# Patient Record
Sex: MALE | Race: ASIAN | NOT HISPANIC OR LATINO | Employment: FULL TIME | URBAN - METROPOLITAN AREA
[De-identification: names, ages, dates, MRNs, and addresses within clinical notes are randomized per-mention and may not be internally consistent; named-entity substitution may affect disease eponyms.]

---

## 2017-02-25 ENCOUNTER — HOSPITAL ENCOUNTER (OUTPATIENT)
Dept: LAB | Facility: MEDICAL CENTER | Age: 62
End: 2017-02-25
Attending: INTERNAL MEDICINE
Payer: COMMERCIAL

## 2017-02-25 DIAGNOSIS — E83.52 HYPERCALCEMIA: ICD-10-CM

## 2017-02-25 DIAGNOSIS — E21.3 HYPERPARATHYROIDISM (HCC): ICD-10-CM

## 2017-02-25 LAB
ALBUMIN SERPL BCP-MCNC: 4.6 G/DL (ref 3.2–4.9)
ALBUMIN/GLOB SERPL: 1.4 G/DL
ALP SERPL-CCNC: 91 U/L (ref 30–99)
ALT SERPL-CCNC: 27 U/L (ref 2–50)
ANION GAP SERPL CALC-SCNC: 5 MMOL/L (ref 0–11.9)
AST SERPL-CCNC: 16 U/L (ref 12–45)
BILIRUB SERPL-MCNC: 0.6 MG/DL (ref 0.1–1.5)
BUN SERPL-MCNC: 15 MG/DL (ref 8–22)
CALCIUM SERPL-MCNC: 11.6 MG/DL (ref 8.5–10.5)
CHLORIDE SERPL-SCNC: 102 MMOL/L (ref 96–112)
CO2 SERPL-SCNC: 31 MMOL/L (ref 20–33)
CREAT SERPL-MCNC: 1.06 MG/DL (ref 0.5–1.4)
CREAT UR-MCNC: 88.4 MG/DL
GLOBULIN SER CALC-MCNC: 3.2 G/DL (ref 1.9–3.5)
GLUCOSE SERPL-MCNC: 93 MG/DL (ref 65–99)
PHOSPHATE SERPL-MCNC: 2.9 MG/DL (ref 2.5–4.5)
POTASSIUM SERPL-SCNC: 3.9 MMOL/L (ref 3.6–5.5)
PROT SERPL-MCNC: 7.8 G/DL (ref 6–8.2)
PTH-INTACT SERPL-MCNC: 85.4 PG/ML (ref 14–72)
SODIUM SERPL-SCNC: 138 MMOL/L (ref 135–145)
UR CREATININE 24 HR 1034: 1856 MG/24 HR (ref 1000–2000)
URINE VOLUMNE 8233: 2100 ML

## 2017-02-25 PROCEDURE — 82570 ASSAY OF URINE CREATININE: CPT

## 2017-02-25 PROCEDURE — 83970 ASSAY OF PARATHORMONE: CPT

## 2017-02-25 PROCEDURE — 36415 COLL VENOUS BLD VENIPUNCTURE: CPT

## 2017-02-25 PROCEDURE — 84100 ASSAY OF PHOSPHORUS: CPT

## 2017-02-25 PROCEDURE — 82340 ASSAY OF CALCIUM IN URINE: CPT

## 2017-02-25 PROCEDURE — 81050 URINALYSIS VOLUME MEASURE: CPT

## 2017-02-25 PROCEDURE — 80053 COMPREHEN METABOLIC PANEL: CPT

## 2017-02-27 ENCOUNTER — HOSPITAL ENCOUNTER (OUTPATIENT)
Dept: LAB | Facility: MEDICAL CENTER | Age: 62
End: 2017-02-27
Attending: INTERNAL MEDICINE
Payer: COMMERCIAL

## 2017-02-27 LAB — CA-I SERPL-SCNC: 1.5 MMOL/L (ref 1.1–1.3)

## 2017-02-27 PROCEDURE — 82330 ASSAY OF CALCIUM: CPT

## 2017-02-28 LAB
CALCIUM 24H UR-MCNC: 14.1 MG/DL
CALCIUM 24H UR-MRATE: 296 MG/D
CALCIUM/CREAT 24H UR: 196 MG/G (ref 20–240)
CREAT 24H UR-MCNC: 72 MG/DL
CREAT 24H UR-MRATE: 1512 MG/D (ref 800–2100)
SPECIMEN VOL ?TM UR: 2100 ML

## 2017-03-10 ENCOUNTER — OFFICE VISIT (OUTPATIENT)
Dept: ENDOCRINOLOGY | Facility: MEDICAL CENTER | Age: 62
End: 2017-03-10
Payer: COMMERCIAL

## 2017-03-10 VITALS
WEIGHT: 174.2 LBS | SYSTOLIC BLOOD PRESSURE: 128 MMHG | OXYGEN SATURATION: 96 % | HEART RATE: 80 BPM | BODY MASS INDEX: 25.8 KG/M2 | DIASTOLIC BLOOD PRESSURE: 82 MMHG | HEIGHT: 69 IN

## 2017-03-10 DIAGNOSIS — E21.0 PRIMARY HYPERPARATHYROIDISM (HCC): ICD-10-CM

## 2017-03-10 DIAGNOSIS — E83.52 HYPERCALCEMIA: ICD-10-CM

## 2017-03-10 PROCEDURE — 99213 OFFICE O/P EST LOW 20 MIN: CPT | Performed by: INTERNAL MEDICINE

## 2017-03-10 NOTE — MR AVS SNAPSHOT
"        Michael Mixon   3/10/2017 8:20 AM   Office Visit   MRN: [id number]    Department:  Endocrinology [facility]   Dept Phone:  [phone]    Description:  Male : 1955   Provider:  Carlos Slater M.D.           Reason for Visit     Follow-Up Hypercalcemia      Allergies as of 3/10/2017     Allergen Noted Reactions    Sulfa Drugs 2010   Anaphylaxis      You were diagnosed with     Primary hyperparathyroidism (CMS-HCC)   [252.01.ICD-9-CM]       Hypercalcemia   [275.42.ICD-9-CM]         Vital Signs     Blood Pressure Pulse Height Weight Body Mass Index Oxygen Saturation    128/82 mmHg 80 1.746 m (5' 8.75\") 79.017 kg (174 lb 3.2 oz) 25.92 kg/m2 96%    Smoking Status                   Never Smoker            Basic Information     Date Of Birth Sex Race Ethnicity Preferred Language    1955 Male  Non- English      Your appointments     Sep 08, 2017 12:00 PM   Established Patient with Carlos Slater M.D.   [facility]    [address], [address]  [place] [address] [phone]           You will be receiving a confirmation call a few days before your appointment from our automated call confirmation system.              Problem List              ICD-10-CM Priority Class Noted - Resolved    Hypertriglyceridemia E78.1   3/28/2014 - Present    History of prostate cancer Z85.46   3/28/2014 - Present    GERD (gastroesophageal reflux disease) K21.9   3/28/2014 - Present    Vitamin D deficiency E55.9   3/28/2014 - Present    Preventative health care Z00.00   2016 - Present    Need for prophylactic vaccination with combined vaccine Z23   2016 - Present      Health Maintenance        Date Due Completion Dates    IMM INFLUENZA (1) 2016 ---    COLONOSCOPY 3/28/2024 3/28/2014 (Prv Comp)    Override on 3/28/2014: Previously completed (was done 6 yrs ago and told to come back in 10 years.)    IMM DTaP/Tdap/Td Vaccine (2 - Td) 2026 " 8/19/2016            Current Immunizations     Tdap Vaccine 8/19/2016      Below and/or attached are the medications your provider expects you to take. Review all of your home medications and newly ordered medications with your provider and/or pharmacist. Follow medication instructions as directed by your provider and/or pharmacist. Please keep your medication list with you and share with your provider. Update the information when medications are discontinued, doses are changed, or new medications (including over-the-counter products) are added; and carry medication information at all times in the event of emergency situations     Allergies:  SULFA DRUGS - Anaphylaxis               Medications  Valid as of: March 10, 2017 -  8:39 AM    Generic Name Brand Name Tablet Size Instructions for use    Clobetasol Propionate (Cream) TEMOVATE 0.05 % Apply  to affected area(s) 2 times a day. Apply bid to effected area.        Fexofenadine-Pseudoephedrine (TABLET SR 12 HR) ALLEGRA-D  MG Take 1 Tab by mouth. One q 12 hours prn ear or nasal congestion.        Gemfibrozil (Tab) LOPID 600 MG Take 1 Tab by mouth 2 times a day. 1/2 ac.        Mupirocin (Ointment) BACTROBAN 2 % Apply to effected area bid prn.        NON SPECIFIED   Vit D 50,000 IU # 8    Sig: Take once week x 8 weeks.        Omeprazole (CAPSULE DELAYED RELEASE) PRILOSEC 20 MG Take 1 Cap by mouth every day. 1-2 daily prn acid reflux.        Tamsulosin HCl (Cap) FLOMAX 0.4 MG Take 0.4 mg by mouth ONE-HALF HOUR AFTER BREAKFAST.        .                 Medicines prescribed today were sent to:     [facility] #[id number] - [place]ATHAN - [address] [address] [facility] [address]    Phone: [phone] Fax: [phone]    Open 24 Hours?: No      Medication refill instructions:       If your prescription bottle indicates you have medication refills left, it is not necessary to call your provider’s office. Please contact your pharmacy and they will refill your  medication.    If your prescription bottle indicates you do not have any refills left, you may request refills at any time through one of the following ways: The online [facility] system (except Urgent Care), by calling your provider’s office, or by asking your pharmacy to contact your provider’s office with a refill request. Medication refills are processed only during regular business hours and may not be available until the next business day. Your provider may request additional information or to have a follow-up visit with you prior to refilling your medication.   *Please Note: Medication refills are assigned a new Rx number when refilled electronically. Your pharmacy may indicate that no refills were authorized even though a new prescription for the same medication is available at the pharmacy. Please request the medicine by name with the pharmacy before contacting your provider for a refill.        Your To Do List     Future Labs/Procedures Complete By Expires    NM-PARATHYROID (SESTAMIBI) SCAN  As directed 3/10/2018      Referral     A referral request has been sent to our patient care coordination department. Please allow 3-5 business days for us to process this request and contact you either by phone or mail. If you do not hear from us by the 5th business day, please call us at [phone].           [facility] Access Code: Activation code not generated  Current [facility] Status: Active

## 2017-03-13 DIAGNOSIS — E78.2 HYPERLIPEMIA, MIXED: ICD-10-CM

## 2017-03-13 RX ORDER — GEMFIBROZIL 600 MG/1
600 TABLET, FILM COATED ORAL 2 TIMES DAILY
Qty: 180 TAB | Refills: 0 | Status: SHIPPED | OUTPATIENT
Start: 2017-03-13 | End: 2017-06-08 | Stop reason: SDUPTHER

## 2017-03-14 NOTE — TELEPHONE ENCOUNTER
Normal recent LFTs.      Domo Nguyen MD  Diplomat, [facility]  [address] [place] [id number]

## 2017-03-31 ENCOUNTER — HOSPITAL ENCOUNTER (OUTPATIENT)
Dept: RADIOLOGY | Facility: MEDICAL CENTER | Age: 62
End: 2017-03-31
Attending: INTERNAL MEDICINE
Payer: COMMERCIAL

## 2017-03-31 DIAGNOSIS — E21.0 PRIMARY HYPERPARATHYROIDISM (HCC): ICD-10-CM

## 2017-03-31 PROCEDURE — A9500 TC99M SESTAMIBI: HCPCS

## 2017-06-08 RX ORDER — GEMFIBROZIL 600 MG/1
TABLET, FILM COATED ORAL
Qty: 180 TAB | Refills: 0 | Status: SHIPPED | OUTPATIENT
Start: 2017-06-08 | End: 2017-09-04 | Stop reason: SDUPTHER

## 2017-09-06 RX ORDER — GEMFIBROZIL 600 MG/1
TABLET, FILM COATED ORAL
Qty: 180 TAB | Refills: 0 | Status: SHIPPED | OUTPATIENT
Start: 2017-09-06 | End: 2018-06-19 | Stop reason: SDUPTHER

## 2017-10-26 ENCOUNTER — OFFICE VISIT (OUTPATIENT)
Dept: MEDICAL GROUP | Age: 62
End: 2017-10-26
Payer: COMMERCIAL

## 2017-10-26 ENCOUNTER — HOSPITAL ENCOUNTER (OUTPATIENT)
Dept: LAB | Facility: MEDICAL CENTER | Age: 62
End: 2017-10-26
Attending: FAMILY MEDICINE
Payer: COMMERCIAL

## 2017-10-26 VITALS
TEMPERATURE: 97.5 F | HEART RATE: 68 BPM | BODY MASS INDEX: 23.82 KG/M2 | SYSTOLIC BLOOD PRESSURE: 122 MMHG | WEIGHT: 160.8 LBS | OXYGEN SATURATION: 97 % | DIASTOLIC BLOOD PRESSURE: 70 MMHG | HEIGHT: 69 IN

## 2017-10-26 DIAGNOSIS — E78.1 HYPERTRIGLYCERIDEMIA: ICD-10-CM

## 2017-10-26 DIAGNOSIS — K21.9 GASTROESOPHAGEAL REFLUX DISEASE WITHOUT ESOPHAGITIS: ICD-10-CM

## 2017-10-26 DIAGNOSIS — Z20.2 EXPOSURE TO SEXUALLY TRANSMITTED DISEASE (STD): ICD-10-CM

## 2017-10-26 DIAGNOSIS — Z85.46 HISTORY OF PROSTATE CANCER: ICD-10-CM

## 2017-10-26 DIAGNOSIS — L70.0 ACNE VULGARIS: ICD-10-CM

## 2017-10-26 DIAGNOSIS — Z00.00 PREVENTATIVE HEALTH CARE: ICD-10-CM

## 2017-10-26 DIAGNOSIS — R63.4 WEIGHT LOSS: ICD-10-CM

## 2017-10-26 LAB
BASOPHILS # BLD AUTO: 0.9 % (ref 0–1.8)
BASOPHILS # BLD: 0.03 K/UL (ref 0–0.12)
EOSINOPHIL # BLD AUTO: 0.06 K/UL (ref 0–0.51)
EOSINOPHIL NFR BLD: 1.8 % (ref 0–6.9)
ERYTHROCYTE [DISTWIDTH] IN BLOOD BY AUTOMATED COUNT: 41.5 FL (ref 35.9–50)
GFR SERPL CREATININE-BSD FRML MDRD: >60 ML/MIN/1.73 M 2
HCT VFR BLD AUTO: 51.2 % (ref 42–52)
HGB BLD-MCNC: 17.2 G/DL (ref 14–18)
IMM GRANULOCYTES # BLD AUTO: 0.02 K/UL (ref 0–0.11)
IMM GRANULOCYTES NFR BLD AUTO: 0.6 % (ref 0–0.9)
LYMPHOCYTES # BLD AUTO: 1.31 K/UL (ref 1–4.8)
LYMPHOCYTES NFR BLD: 39.2 % (ref 22–41)
MCH RBC QN AUTO: 30.1 PG (ref 27–33)
MCHC RBC AUTO-ENTMCNC: 33.6 G/DL (ref 33.7–35.3)
MCV RBC AUTO: 89.5 FL (ref 81.4–97.8)
MONOCYTES # BLD AUTO: 0.22 K/UL (ref 0–0.85)
MONOCYTES NFR BLD AUTO: 6.6 % (ref 0–13.4)
NEUTROPHILS # BLD AUTO: 1.7 K/UL (ref 1.82–7.42)
NEUTROPHILS NFR BLD: 50.9 % (ref 44–72)
NRBC # BLD AUTO: 0 K/UL
NRBC BLD AUTO-RTO: 0 /100 WBC
PLATELET # BLD AUTO: 282 K/UL (ref 164–446)
PMV BLD AUTO: 10 FL (ref 9–12.9)
RBC # BLD AUTO: 5.72 M/UL (ref 4.7–6.1)
WBC # BLD AUTO: 3.3 K/UL (ref 4.8–10.8)

## 2017-10-26 PROCEDURE — 80061 LIPID PANEL: CPT

## 2017-10-26 PROCEDURE — 80074 ACUTE HEPATITIS PANEL: CPT

## 2017-10-26 PROCEDURE — 80053 COMPREHEN METABOLIC PANEL: CPT

## 2017-10-26 PROCEDURE — 86780 TREPONEMA PALLIDUM: CPT

## 2017-10-26 PROCEDURE — 36415 COLL VENOUS BLD VENIPUNCTURE: CPT

## 2017-10-26 PROCEDURE — 87491 CHLMYD TRACH DNA AMP PROBE: CPT

## 2017-10-26 PROCEDURE — 87591 N.GONORRHOEAE DNA AMP PROB: CPT

## 2017-10-26 PROCEDURE — 85025 COMPLETE CBC W/AUTO DIFF WBC: CPT

## 2017-10-26 PROCEDURE — 99215 OFFICE O/P EST HI 40 MIN: CPT | Performed by: FAMILY MEDICINE

## 2017-10-26 PROCEDURE — 87389 HIV-1 AG W/HIV-1&-2 AB AG IA: CPT

## 2017-10-26 PROCEDURE — 84153 ASSAY OF PSA TOTAL: CPT

## 2017-10-26 RX ORDER — ERYTHROMYCIN AND BENZOYL PEROXIDE 30; 50 MG/G; MG/G
GEL TOPICAL
Qty: 30 G | Refills: 2 | Status: SHIPPED | OUTPATIENT
Start: 2017-10-26

## 2017-10-26 NOTE — ASSESSMENT & PLAN NOTE
The patient is a 61-year-old male who presents to clinic for his annual wellness visit. He has a significant past medical history of hyperlipidemia, acne, GERD, history of passing cancer.   The patient denied any chest pain, no sob, no ortiz, no  pnd, no orthopnea, no headache, no changes in vision, no numbness or tingling, no nausea, no diarrhea, no abdominal pain, no fevers, no chills, no bright red blood per rectum, no  difficulty urinating, no burning during micturition, no depressed mood, no other concerns.

## 2017-10-26 NOTE — ASSESSMENT & PLAN NOTE
Changed his diet and began dancing every night and hiking  This was intentional weight loss, he lost 14 pounds in one year. He feels much better lighter and more energetic.

## 2017-10-26 NOTE — PROGRESS NOTES
This medical record contains text that has been entered with the assistance of computer voice recognition and dictation software.  Therefore, it may contain unintended errors in text, spelling, punctuation, or grammar    Chief Complaint   Patient presents with   • Other     see reason for visit       Michael Mixon is a 61 y.o. male here evaluation and management of: annual exam, GERD, acne, h/o prostate cancer, sti check      HPI:     History of prostate cancer  Has annual visits with urology, Dr. Thorpe  Has yearly PSA draws  He was treated with implants   No hesistancy, no dysuria, is able to maintain urinary stream, no spliting or spraying of urinary stream, no terminal dribbling.            Weight loss  Changed his diet and began dancing every night and hiking  This was intentional weight loss, he lost 14 pounds in one year. He feels much better lighter and more energetic.    GERD (gastroesophageal reflux disease)  Patient will take Prilosec as needed not daily.   Patient denies any difficulty swallowing, no regurgitation, no eructation, no weight loss, no nocturnal asthma no food getting stuck in the chest.      Hypertriglyceridemia  The patient has been compliant with gemfibrozil 600 mg by mouth twice a day, he has also lost 14 pounds in last year through diet and exercise. He has not repeated labs yet.    Preventative health care  The patient is a 61-year-old male who presents to clinic for his annual wellness visit. He has a significant past medical history of hyperlipidemia, acne, GERD, history of passing cancer.   The patient denied any chest pain, no sob, no ortiz, no  pnd, no orthopnea, no headache, no changes in vision, no numbness or tingling, no nausea, no diarrhea, no abdominal pain, no fevers, no chills, no bright red blood per rectum, no  difficulty urinating, no burning during micturition, no depressed mood, no other concerns.      Current medicines (including changes today)  Current Outpatient  "Prescriptions   Medication Sig Dispense Refill   • benzoyl peroxide-erythromycin (BENZAMYCIN) gel AAA bid prn 30 g 2   • gemfibrozil (LOPID) 600 MG Tab TAKE 1 TAB BY MOUTH 2 TIMES A DAY. 180 Tab 0   • tamsulosin (FLOMAX) 0.4 MG capsule Take 0.4 mg by mouth ONE-HALF HOUR AFTER BREAKFAST.     • omeprazole (PRILOSEC) 20 MG delayed-release capsule Take 1 Cap by mouth every day. 1-2 daily prn acid reflux. 90 Cap 3   • mupirocin (BACTROBAN) 2 % OINT Apply to effected area bid prn. 1 Tube 2   • NON SPECIFIED Vit D 50,000 IU # 8    Sig: Take once week x 8 weeks. 8 Tab 0   • clobetasol (TEMOVATE) 0.05 % CREA Apply  to affected area(s) 2 times a day. Apply bid to effected area. 45 g 1   • fexofenadine-pseudoephedrine (ALLEGRA-D)  MG per tablet Take 1 Tab by mouth. One q 12 hours prn ear or nasal congestion. 60 Tab 3     No current facility-administered medications for this visit.      He  has a past medical history of Cancer (CMS-[facility]) and Parathyroid disorder (CMS-[facility]).  He  has no past surgical history on file.  Social History   Substance Use Topics   • Smoking status: Never Smoker   • Smokeless tobacco: Never Used   • Alcohol use No     Social History     Social History Narrative   • No narrative on file     Family History   Problem Relation Age of Onset   • Cancer Father      prostate cancer     Family Status   Relation Status   • Mother Alive   • Father  at age 92   • Sister Alive   • Brother Alive   • Maternal Grandmother    • Maternal Grandfather    • Paternal Grandmother    • Paternal Grandfather    • Brother Alive   • Brother Alive         ROS    Please see hpi     All other systems reviewed and are negative     Objective:     Blood pressure 122/70, pulse 68, temperature 36.4 °C (97.5 °F), height 1.746 m (5' 8.74\"), weight 72.9 kg (160 lb 12.8 oz), SpO2 97 %. Body mass index is 23.93 kg/m².  Physical Exam:    Constitutional: Alert, no distress.  Skin: Warm, dry, good " turgor, no rashes in visible areas.  Eye: Equal, round and reactive, conjunctiva clear, lids normal.  ENMT: Lips without lesions, good dentition, oropharynx clear.  Neck: Trachea midline, no masses, no thyromegaly. No cervical or supraclavicular lymphadenopathy.  Respiratory: Unlabored respiratory effort, lungs clear to auscultation, no wheezes, no ronchi.  Cardiovascular: Normal S1, S2, no murmur, no edema.  Abdomen: Soft, non-tender, no masses, no hepatosplenomegaly.  Psych: Alert and oriented x3, normal affect and mood.          Assessment and Plan:   The following treatment plan was discussed      1. History of prostate cancer  Due for PSA  Needs for urology visit    - PROSTATE SPECIFIC AG DIAGNOSTIC; Future    2. Hypertriglyceridemia  Need new labs    - COMP METABOLIC PANEL; Future  - CBC WITH DIFFERENTIAL; Future  - LIPID PROFILE; Future    3. Exposure to sexually transmitted disease (STD)  Patietnt was given safe sex practices,  I also explained that standard Alisha’s used in clinical practice do not detect antibodies to HIV until 3-6wks after exposure to infection. Furthermore patient should ask sexual partner of their h/o STIs.    - HIV ANTIBODIES; Future  - CHLAMYDIA/GC AMP URINE OR SWAB; Future  - T.PALLIDUM AB EIA; Future  - HEPATITIS PANEL ACUTE(4 COMPONENTS); Future    4. Weight loss  This was intentional  And done in a healthy way    5. Acne vulgaris    Will use antimicrobial as patients acne appears to be inflammatory plus comdolytic benzoyl peroxide which will decrease the chance of antibiotic resistance.    - benzoyl peroxide-erythromycin (BENZAMYCIN) gel; AAA bid prn  Dispense: 30 g; Refill: 2    6. Gastroesophageal reflux disease without esophagitis     Gerd precautions given (avoid the supine position after eating, avoid tight fitting clothing, head of bed elevation by raisin legs of bed,  avoid eating prior to sleeping, avoid reflux-inducing foods (foods high in fat, chocolate, peppermint, and  excessive alcohol, which can decrease LES pressure), promote salivation by chewing gum or lozenges,      7. Preventative health care    Refused flu vaccine        Over 40 minutes spent with patient face to face, greater than 50% time spent with plan/cordination of care as above in my A/P.      HEALTH MAINTENANCE:    Instructed to Follow up in clinic or ER for worsening symptoms, difficulty breathing, lack of expected recovery, or should new symptoms or problems arise.    Followup: Return in about 4 weeks (around 11/23/2017) for punch biopsy, Long.       Once again this medical record contains text that has been entered with the assistance of computer voice recognition and dictation software.  Therefore, it may contain unintended errors in text, spelling, punctuation, or grammar

## 2017-10-26 NOTE — ASSESSMENT & PLAN NOTE
The patient has been compliant with gemfibrozil 600 mg by mouth twice a day, he has also lost 14 pounds in last year through diet and exercise. He has not repeated labs yet.

## 2017-10-26 NOTE — ASSESSMENT & PLAN NOTE
Patient will take Prilosec as needed not daily.   Patient denies any difficulty swallowing, no regurgitation, no eructation, no weight loss, no nocturnal asthma no food getting stuck in the chest.

## 2017-10-26 NOTE — ASSESSMENT & PLAN NOTE
Has annual visits with urology, Dr. Thorpe  Has yearly PSA draws  He was treated with implants   No hesistancy, no dysuria, is able to maintain urinary stream, no spliting or spraying of urinary stream, no terminal dribbling.

## 2017-10-27 LAB
ALBUMIN SERPL BCP-MCNC: 4.6 G/DL (ref 3.2–4.9)
ALBUMIN/GLOB SERPL: 1.6 G/DL
ALP SERPL-CCNC: 84 U/L (ref 30–99)
ALT SERPL-CCNC: 13 U/L (ref 2–50)
ANION GAP SERPL CALC-SCNC: 5 MMOL/L (ref 0–11.9)
AST SERPL-CCNC: 16 U/L (ref 12–45)
BILIRUB SERPL-MCNC: 1 MG/DL (ref 0.1–1.5)
BUN SERPL-MCNC: 11 MG/DL (ref 8–22)
C TRACH DNA SPEC QL NAA+PROBE: NEGATIVE
CALCIUM SERPL-MCNC: 10.7 MG/DL (ref 8.5–10.5)
CHLORIDE SERPL-SCNC: 102 MMOL/L (ref 96–112)
CHOLEST SERPL-MCNC: 159 MG/DL (ref 100–199)
CO2 SERPL-SCNC: 29 MMOL/L (ref 20–33)
CREAT SERPL-MCNC: 0.95 MG/DL (ref 0.5–1.4)
GLOBULIN SER CALC-MCNC: 2.9 G/DL (ref 1.9–3.5)
GLUCOSE SERPL-MCNC: 97 MG/DL (ref 65–99)
HAV IGM SERPL QL IA: NEGATIVE
HBV CORE IGM SER QL: NEGATIVE
HBV SURFACE AG SER QL: NEGATIVE
HCV AB SER QL: NEGATIVE
HDLC SERPL-MCNC: 33 MG/DL
HIV 1+2 AB+HIV1 P24 AG SERPL QL IA: NON REACTIVE
LDLC SERPL CALC-MCNC: 99 MG/DL
N GONORRHOEA DNA SPEC QL NAA+PROBE: NEGATIVE
POTASSIUM SERPL-SCNC: 4.2 MMOL/L (ref 3.6–5.5)
PROT SERPL-MCNC: 7.5 G/DL (ref 6–8.2)
PSA SERPL-MCNC: 1.2 NG/ML (ref 0–4)
SODIUM SERPL-SCNC: 136 MMOL/L (ref 135–145)
SPECIMEN SOURCE: NORMAL
TREPONEMA PALLIDUM IGG+IGM AB [PRESENCE] IN SERUM OR PLASMA BY IMMUNOASSAY: NON REACTIVE
TRIGL SERPL-MCNC: 134 MG/DL (ref 0–149)

## 2017-11-16 ENCOUNTER — TELEPHONE (OUTPATIENT)
Dept: MEDICAL GROUP | Age: 62
End: 2017-11-16

## 2017-11-16 NOTE — TELEPHONE ENCOUNTER
ESTABLISHED PATIENT PRE-VISIT PLANNING     Note: Patient will not be contacted if there is no indication to call.     1.  Reviewed notes from the last few office visits within the medical group: Yes    2.  If any orders were placed at last visit or intended to be done for this visit (i.e. 6 mos follow-up), do we have Results/Consult Notes?        •  Labs - Labs ordered, completed on 10/26 and results are in chart.   Note: If patient appointment is for lab review and patient did not complete labs, check with provider if OK to reschedule patient until labs completed.       •  Imaging - Imaging was not ordered at last office visit.       •  Referrals - No referrals were ordered at last office visit.    3. Is this appointment scheduled as a Hospital Follow-Up? No    4.  Immunizations were updated in Epic using WebIZ?: Epic matches WebIZ       •  Web Iz Recommendations: ZOSTAVAX (Shingles)    5.  Patient is due for the following Health Maintenance Topics:   Health Maintenance Due   Topic Date Due   • IMM ZOSTER VACCINE  11/15/2017       - Patient is up-to-date on all Health Maintenance topics. No records have been requested at this time.    6.  Patient was NOT informed to arrive 15 min prior to their scheduled appointment and bring in their medication bottles.

## 2017-11-17 ENCOUNTER — OFFICE VISIT (OUTPATIENT)
Dept: MEDICAL GROUP | Age: 62
End: 2017-11-17
Payer: COMMERCIAL

## 2017-11-17 ENCOUNTER — HOSPITAL ENCOUNTER (OUTPATIENT)
Facility: MEDICAL CENTER | Age: 62
End: 2017-11-17
Attending: FAMILY MEDICINE
Payer: COMMERCIAL

## 2017-11-17 ENCOUNTER — HOSPITAL ENCOUNTER (OUTPATIENT)
Dept: LAB | Facility: MEDICAL CENTER | Age: 62
End: 2017-11-17
Attending: FAMILY MEDICINE
Payer: COMMERCIAL

## 2017-11-17 VITALS
HEIGHT: 68 IN | SYSTOLIC BLOOD PRESSURE: 118 MMHG | TEMPERATURE: 98.2 F | WEIGHT: 164 LBS | HEART RATE: 78 BPM | DIASTOLIC BLOOD PRESSURE: 80 MMHG | OXYGEN SATURATION: 96 % | RESPIRATION RATE: 16 BRPM | BODY MASS INDEX: 24.86 KG/M2

## 2017-11-17 DIAGNOSIS — D48.9 NEOPLASM OF UNCERTAIN BEHAVIOR: ICD-10-CM

## 2017-11-17 DIAGNOSIS — E83.52 HYPERCALCEMIA: ICD-10-CM

## 2017-11-17 DIAGNOSIS — M79.10 MYALGIA: ICD-10-CM

## 2017-11-17 DIAGNOSIS — Z85.46 HISTORY OF PROSTATE CANCER: ICD-10-CM

## 2017-11-17 LAB
ALP SERPL-CCNC: 85 U/L (ref 30–99)
CALCIUM SERPL-MCNC: 11.1 MG/DL (ref 8.5–10.5)
PTH-INTACT SERPL-MCNC: 108.1 PG/ML (ref 14–72)
TSH SERPL DL<=0.005 MIU/L-ACNC: 2.19 UIU/ML (ref 0.3–3.7)

## 2017-11-17 PROCEDURE — 12031 INTMD RPR S/A/T/EXT 2.5 CM/<: CPT | Performed by: FAMILY MEDICINE

## 2017-11-17 PROCEDURE — 84443 ASSAY THYROID STIM HORMONE: CPT

## 2017-11-17 PROCEDURE — 84153 ASSAY OF PSA TOTAL: CPT

## 2017-11-17 PROCEDURE — 84075 ASSAY ALKALINE PHOSPHATASE: CPT

## 2017-11-17 PROCEDURE — 82552 ASSAY OF CPK IN BLOOD: CPT

## 2017-11-17 PROCEDURE — 99000 SPECIMEN HANDLING OFFICE-LAB: CPT | Performed by: FAMILY MEDICINE

## 2017-11-17 PROCEDURE — 88305 TISSUE EXAM BY PATHOLOGIST: CPT

## 2017-11-17 PROCEDURE — 36415 COLL VENOUS BLD VENIPUNCTURE: CPT

## 2017-11-17 PROCEDURE — 11402 EXC TR-EXT B9+MARG 1.1-2 CM: CPT | Mod: 51 | Performed by: FAMILY MEDICINE

## 2017-11-17 PROCEDURE — 84154 ASSAY OF PSA FREE: CPT

## 2017-11-17 PROCEDURE — 99213 OFFICE O/P EST LOW 20 MIN: CPT | Mod: 25 | Performed by: FAMILY MEDICINE

## 2017-11-17 PROCEDURE — 82550 ASSAY OF CK (CPK): CPT

## 2017-11-17 PROCEDURE — 83970 ASSAY OF PARATHORMONE: CPT

## 2017-11-17 ASSESSMENT — PAIN SCALES - GENERAL: PAINLEVEL: NO PAIN

## 2017-11-17 NOTE — PROGRESS NOTES
This medical record contains text that has been entered with the assistance of computer voice recognition and dictation software.  Therefore, it may contain unintended errors in text, spelling, punctuation, or grammar    Chief Complaint   Patient presents with   • Biopsy       Michael Mixon is a 62 y.o. male here evaluation and management of: muscle ache, excision      HPI:     Neoplasm of uncertain behavior  The patient states that he noticed a new dark spot on the mid abdomen region, he is not sure how long it's been there however he thinks it may have changed she would like it removed.    Myalgia  Patient is a 62-year-old male who presents with a chief complaint of right pectoralis muscle aches, associated with a right scapular ache present for about one month now. It's worse when coughing or moving, resolves with rest. It is not associated exertion, he denies any chest pain or shortness of breath or dyspnea on exertion no PND no orthopnea.    Current medicines (including changes today)  Current Outpatient Prescriptions   Medication Sig Dispense Refill   • gemfibrozil (LOPID) 600 MG Tab TAKE 1 TAB BY MOUTH 2 TIMES A DAY. 180 Tab 0   • tamsulosin (FLOMAX) 0.4 MG capsule Take 0.4 mg by mouth ONE-HALF HOUR AFTER BREAKFAST.     • benzoyl peroxide-erythromycin (BENZAMYCIN) gel AAA bid prn 30 g 2   • omeprazole (PRILOSEC) 20 MG delayed-release capsule Take 1 Cap by mouth every day. 1-2 daily prn acid reflux. 90 Cap 3   • mupirocin (BACTROBAN) 2 % OINT Apply to effected area bid prn. 1 Tube 2   • NON SPECIFIED Vit D 50,000 IU # 8    Sig: Take once week x 8 weeks. 8 Tab 0   • clobetasol (TEMOVATE) 0.05 % CREA Apply  to affected area(s) 2 times a day. Apply bid to effected area. 45 g 1   • fexofenadine-pseudoephedrine (ALLEGRA-D)  MG per tablet Take 1 Tab by mouth. One q 12 hours prn ear or nasal congestion. 60 Tab 3     No current facility-administered medications for this visit.      He  has a past medical history  "of Cancer (CMS-HCC) and Parathyroid disorder (CMS-HCC).  He  has no past surgical history on file.  Social History   Substance Use Topics   • Smoking status: Never Smoker   • Smokeless tobacco: Never Used   • Alcohol use No     Social History     Social History Narrative   • No narrative on file     Family History   Problem Relation Age of Onset   • Cancer Father      prostate cancer     Family Status   Relation Status   • Mother Alive   • Father  at age 92   • Sister Alive   • Brother Alive   • Maternal Grandmother    • Maternal Grandfather    • Paternal Grandmother    • Paternal Grandfather    • Brother Alive   • Brother Alive         ROS    Please see hpi     All other systems reviewed and are negative     Objective:     Blood pressure 118/80, pulse 78, temperature 36.8 °C (98.2 °F), resp. rate 16, height 1.727 m (5' 8\"), weight 74.4 kg (164 lb), SpO2 96 %. Body mass index is 24.94 kg/m².  Physical Exam:    Constitutional: Alert, no distress.  Skin: Warm, dry, good turgor, no rashes in visible areas.  Eye: Equal, round and reactive, conjunctiva clear, lids normal.  ENMT: Lips without lesions, good dentition, oropharynx clear.  Neck: Trachea midline, no masses, no thyromegaly. No cervical or supraclavicular lymphadenopathy.  Respiratory: Unlabored respiratory effort, lungs clear to auscultation, no wheezes, no ronchi.  Cardiovascular: Normal S1, S2, no murmur, no edema.  Abdomen: Soft, non-tender, no masses, no hepatosplenomegaly.  Psych: Alert and oriented x3, normal affect and mood.        Excision---8 mm, irregular, assymmetric, hyperpigmented macule located on epigastric region    Final Length of Excision--1.1cm    After informed written consent was obtained, using Betadine for cleansing and 1% Lidocaine w- epinephrine for anesthetic, with sterile technique a 6 mm punch tool was used to obtain and excise  the lesion through dermis (full thickness) . Intent was to remove " entire lesion with margins . Hemostasis was obtained by pressure and wound was  Sutured  With 3.0 Ethilon x1 Plus Vycryl x1. ( one in intermediate layer closure) Antibiotic dressing is applied, and wound care instructions provided. Be alert for any signs of cutaneous infection. The specimen is labeled and sent to pathology for evaluation. The procedure was well tolerated without complications.    Intermediate layer closure    The needle was inserted in the dermis and directed toward the skin surface, exiting near the dermal-epidermal junction on the same side.   then the needle was inserted on the opposite side of the wound near the dermal-epidermal junction, directly across from the point of exit.  The suture loop was completed in the dermis, directly opposite the origin of the loop, and the knot tied.  .          Assessment and Plan:   The following treatment plan was discussed      1. Hypercalcemia  Managed by endocrinology  Normal us of PTH glands    - PTH INTACT W/CA    2. History of prostate cancer    - ALKALINE PHOSPHATASE  - PSA TOTAL + %FREE    3. Myalgia    This is likely musculoskeletal in origin  Echo from the exercise injury that he does not recall  No clinical suspicion for coronary artery disease  We'll also obtain a thyroid panel  - CK ISOENZYMES SERUM; Future  - REFERRAL TO PHYSICAL THERAPY Reason for Therapy: Eval/Treat/Report  - TSH WITH REFLEX TO FT4    4. Neoplasm of uncertain behavior  Patient tollerrated procedure well  There were no adverse events  Patient was given post procedure precautions     - PATHOLOGY SPECIMEN; Future        HEALTH MAINTENANCE:    Instructed to Follow up in clinic or ER for worsening symptoms, difficulty breathing, lack of expected recovery, or should new symptoms or problems arise.    Followup: Return in about 2 weeks (around 12/1/2017) for Suture Removal.       Once again this medical record contains text that has been entered with the assistance of computer voice  recognition and dictation software.  Therefore, it may contain unintended errors in text, spelling, punctuation, or grammar

## 2017-11-17 NOTE — ASSESSMENT & PLAN NOTE
Patient is a 62-year-old male who presents with a chief complaint of right pectoralis muscle aches, associated with a right scapular ache present for about one month now. It's worse when coughing or moving, resolves with rest. It is not associated exertion, he denies any chest pain or shortness of breath or dyspnea on exertion no PND no orthopnea.

## 2017-11-17 NOTE — ASSESSMENT & PLAN NOTE
The patient states that he noticed a new dark spot on the mid abdomen region, he is not sure how long it's been there however he thinks it may have changed she would like it removed.

## 2017-11-19 LAB
PSA FREE MFR SERPL: 15 %
PSA FREE SERPL-MCNC: 0.2 NG/ML
PSA SERPL-MCNC: 1.3 NG/ML (ref 0–4)

## 2017-11-20 LAB
CK BB CFR SERPL ELPH: 0 % (ref 0–0)
CK MACRO1 CFR SERPL: 0 % (ref 0–0)
CK MACRO2 CFR SERPL: 0 % (ref 0–0)
CK MB CFR SERPL ELPH: 0 % (ref 0–4)
CK MM CFR SERPL ELPH: 100 % (ref 96–100)
CK SERPL-CCNC: 125 U/L (ref 20–200)

## 2017-11-21 ENCOUNTER — OFFICE VISIT (OUTPATIENT)
Dept: MEDICAL GROUP | Age: 62
End: 2017-11-21
Payer: COMMERCIAL

## 2017-11-21 VITALS
SYSTOLIC BLOOD PRESSURE: 128 MMHG | OXYGEN SATURATION: 97 % | BODY MASS INDEX: 24.55 KG/M2 | TEMPERATURE: 98.1 F | HEIGHT: 68 IN | HEART RATE: 69 BPM | DIASTOLIC BLOOD PRESSURE: 82 MMHG | WEIGHT: 162 LBS

## 2017-11-21 DIAGNOSIS — Z48.02 VISIT FOR SUTURE REMOVAL: ICD-10-CM

## 2017-11-21 PROCEDURE — 99024 POSTOP FOLLOW-UP VISIT: CPT | Performed by: FAMILY MEDICINE

## 2017-11-21 NOTE — PROGRESS NOTES
This medical record contains text that has been entered with the assistance of computer voice recognition and dictation software.  Therefore, it may contain unintended errors in text, spelling, punctuation, or grammar    No chief complaint on file.      Michael Mixon is a 62 y.o. male here evaluation and management of: suture removal      HPI:     Visit for suture removal    The patient underwent excision and returns for suture removal.    Path is Pending    Current medicines (including changes today)  Current Outpatient Prescriptions   Medication Sig Dispense Refill   • benzoyl peroxide-erythromycin (BENZAMYCIN) gel AAA bid prn 30 g 2   • gemfibrozil (LOPID) 600 MG Tab TAKE 1 TAB BY MOUTH 2 TIMES A DAY. 180 Tab 0   • tamsulosin (FLOMAX) 0.4 MG capsule Take 0.4 mg by mouth ONE-HALF HOUR AFTER BREAKFAST.     • omeprazole (PRILOSEC) 20 MG delayed-release capsule Take 1 Cap by mouth every day. 1-2 daily prn acid reflux. 90 Cap 3   • mupirocin (BACTROBAN) 2 % OINT Apply to effected area bid prn. 1 Tube 2   • NON SPECIFIED Vit D 50,000 IU # 8    Sig: Take once week x 8 weeks. 8 Tab 0   • clobetasol (TEMOVATE) 0.05 % CREA Apply  to affected area(s) 2 times a day. Apply bid to effected area. 45 g 1   • fexofenadine-pseudoephedrine (ALLEGRA-D)  MG per tablet Take 1 Tab by mouth. One q 12 hours prn ear or nasal congestion. 60 Tab 3     No current facility-administered medications for this visit.      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  He  has no past surgical history on file.  Social History   Substance Use Topics   • Smoking status: Never Smoker   • Smokeless tobacco: Never Used   • Alcohol use No     Social History     Social History Narrative   • No narrative on file     Family History   Problem Relation Age of Onset   • Cancer Father      prostate cancer     Family Status   Relation Status   • Mother Alive   • Father  at age 92   • Sister Alive   •  Brother Alive   • Maternal Grandmother    • Maternal Grandfather    • Paternal Grandmother    • Paternal Grandfather    • Brother Alive   • Brother Alive         ROS    Please see hpi     All other systems reviewed and are negative     Objective:     There were no vitals taken for this visit. There is no height or weight on file to calculate BMI.  Physical Exam:    Constitutional: Alert, no distress.  Skin: Warm, dry, good turgor, no rashes in visible areas.  Eye: Equal, round and reactive, conjunctiva clear, lids normal.  ENMT: Lips without lesions, good dentition, oropharynx clear.  Neck: Trachea midline, no masses, no thyromegaly. No cervical or supraclavicular lymphadenopathy.  Respiratory: Unlabored respiratory effort, lungs clear to auscultation, no wheezes, no ronchi.  Cardiovascular: Normal S1, S2, no murmur, no edema.  Abdomen: Soft, non-tender, no masses, no hepatosplenomegaly.  Psych: Alert and oriented x3, normal affect and mood.          Assessment and Plan:   The following treatment plan was discussed      1. Visit for suture removal    Sutures removed in normal clean fashion.  There were no adverse events.          HEALTH MAINTENANCE:    Instructed to Follow up in clinic or ER for worsening symptoms, difficulty breathing, lack of expected recovery, or should new symptoms or problems arise.    Followup: Return in about 4 weeks (around 2017) for punch biopsy.       Once again this medical record contains text that has been entered with the assistance of computer voice recognition and dictation software.  Therefore, it may contain unintended errors in text, spelling, punctuation, or grammar

## 2018-03-09 ENCOUNTER — HOSPITAL ENCOUNTER (OUTPATIENT)
Dept: RADIOLOGY | Facility: MEDICAL CENTER | Age: 63
End: 2018-03-09
Attending: UROLOGY
Payer: COMMERCIAL

## 2018-03-09 DIAGNOSIS — R31.0 GROSS HEMATURIA: ICD-10-CM

## 2018-03-09 PROCEDURE — 74178 CT ABD&PLV WO CNTR FLWD CNTR: CPT

## 2018-03-09 PROCEDURE — 700117 HCHG RX CONTRAST REV CODE 255: Performed by: UROLOGY

## 2018-03-09 RX ADMIN — IOHEXOL 100 ML: 350 INJECTION, SOLUTION INTRAVENOUS at 10:04

## 2018-06-20 RX ORDER — GEMFIBROZIL 600 MG/1
TABLET, FILM COATED ORAL
Qty: 180 TAB | Refills: 0 | Status: SHIPPED | OUTPATIENT
Start: 2018-06-20 | End: 2018-09-23 | Stop reason: SDUPTHER

## 2018-09-24 RX ORDER — GEMFIBROZIL 600 MG/1
TABLET, FILM COATED ORAL
Qty: 180 TAB | Refills: 0 | Status: SHIPPED | OUTPATIENT
Start: 2018-09-24 | End: 2018-12-25 | Stop reason: SDUPTHER

## 2018-12-31 RX ORDER — GEMFIBROZIL 600 MG/1
TABLET, FILM COATED ORAL
Qty: 180 TAB | Refills: 0 | Status: SHIPPED | OUTPATIENT
Start: 2018-12-31 | End: 2020-03-23

## 2020-03-24 RX ORDER — GEMFIBROZIL 600 MG/1
TABLET, FILM COATED ORAL
Qty: 180 TAB | Refills: 0 | Status: SHIPPED | OUTPATIENT
Start: 2020-03-24